# Patient Record
Sex: FEMALE | Race: WHITE | NOT HISPANIC OR LATINO | URBAN - METROPOLITAN AREA
[De-identification: names, ages, dates, MRNs, and addresses within clinical notes are randomized per-mention and may not be internally consistent; named-entity substitution may affect disease eponyms.]

---

## 2024-12-03 ENCOUNTER — OFFICE VISIT (OUTPATIENT)
Dept: URGENT CARE | Age: 49
End: 2024-12-03
Payer: COMMERCIAL

## 2024-12-03 VITALS
BODY MASS INDEX: 23.91 KG/M2 | SYSTOLIC BLOOD PRESSURE: 130 MMHG | OXYGEN SATURATION: 98 % | TEMPERATURE: 97.9 F | HEART RATE: 82 BPM | DIASTOLIC BLOOD PRESSURE: 81 MMHG | WEIGHT: 135 LBS

## 2024-12-03 DIAGNOSIS — J40 BRONCHITIS: Primary | ICD-10-CM

## 2024-12-03 RX ORDER — CYCLOBENZAPRINE HCL 10 MG
TABLET ORAL
COMMUNITY
Start: 2024-07-07

## 2024-12-03 RX ORDER — BROMPHENIRAMINE MALEATE, PSEUDOEPHEDRINE HYDROCHLORIDE, AND DEXTROMETHORPHAN HYDROBROMIDE 2; 30; 10 MG/5ML; MG/5ML; MG/5ML
10 SYRUP ORAL 4 TIMES DAILY PRN
Qty: 120 ML | Refills: 0 | Status: SHIPPED | OUTPATIENT
Start: 2024-12-03 | End: 2024-12-13

## 2024-12-03 RX ORDER — BENZONATATE 100 MG/1
CAPSULE ORAL
COMMUNITY
Start: 2024-11-16

## 2024-12-03 RX ORDER — BUSPIRONE HYDROCHLORIDE 10 MG/1
1 TABLET ORAL
COMMUNITY
Start: 2024-01-18

## 2024-12-03 RX ORDER — DIGITAL THERAPEUTIC,REN DEVICE
MISCELLANEOUS MISCELLANEOUS
COMMUNITY
Start: 2023-12-26

## 2024-12-03 RX ORDER — BUSPIRONE HYDROCHLORIDE 7.5 MG/1
1 TABLET ORAL
COMMUNITY
Start: 2024-11-13

## 2024-12-03 RX ORDER — AZITHROMYCIN 250 MG/1
TABLET, FILM COATED ORAL
Qty: 6 TABLET | Refills: 0 | Status: SHIPPED | OUTPATIENT
Start: 2024-12-03

## 2024-12-03 RX ORDER — DOCUSATE SODIUM 100 MG/1
1 CAPSULE, LIQUID FILLED ORAL
COMMUNITY
Start: 2024-09-23

## 2024-12-03 RX ORDER — BUSPIRONE HYDROCHLORIDE 15 MG/1
1 TABLET ORAL
COMMUNITY
Start: 2024-09-18

## 2024-12-03 RX ORDER — CEFADROXIL 500 MG/1
1 CAPSULE ORAL
COMMUNITY
Start: 2024-09-23

## 2024-12-03 ASSESSMENT — ENCOUNTER SYMPTOMS: COUGH: 1

## 2024-12-03 NOTE — PROGRESS NOTES
Subjective   Patient ID: Ethel Joy is a 49 y.o. female. They present today with a chief complaint of Cough (1 month - cough congestion/).    History of Present Illness  This is a 49-year-old female who presents to the urgent care with complaints of a cough.  Patient states has had a cough for 1 month now.  Patient states she is originally placed on 5 days of doxycycline and did not notice any improvement as it was only a 5-day course.  Patient states she has not had any recent fevers.  Patient states it is a productive cough.  Patient denies any known exposures.  Patient denies any other systemic complaints at this time.      Cough        Past Medical History  Allergies as of 12/03/2024 - Reviewed 12/03/2024   Allergen Reaction Noted    Erythromycin Nausea/vomiting 08/27/2009    Other Other 04/09/2013    Penicillins Rash 04/09/2013       (Not in a hospital admission)       Past Medical History:   Diagnosis Date    Personal history of cervical dysplasia     History of cervical dysplasia    Personal history of other diseases of the digestive system     History of gastroesophageal reflux (GERD)    Personal history of other diseases of the nervous system and sense organs     History of migraine       Past Surgical History:   Procedure Laterality Date    OTHER SURGICAL HISTORY  02/27/2014    Cervix Cryosurgery        reports that she has never smoked. She has never used smokeless tobacco. She reports current alcohol use of about 4.0 standard drinks of alcohol per week. She reports that she does not use drugs.    Review of Systems  Review of Systems   Respiratory:  Positive for cough.    All other systems reviewed and are negative.                                 Objective    Vitals:    12/03/24 1304   BP: 130/81   Pulse: 82   Temp: 36.6 °C (97.9 °F)   SpO2: 98%   Weight: 61.2 kg (135 lb)     No LMP recorded (lmp unknown).    Physical Exam  Vitals and nursing note reviewed.   Constitutional:       Appearance: Normal  appearance.   HENT:      Head: Normocephalic and atraumatic.      Right Ear: Ear canal and external ear normal.      Left Ear: Ear canal and external ear normal.      Ears:      Comments: Middle ear fluid bilaterally     Nose: Nose normal.      Mouth/Throat:      Mouth: Mucous membranes are moist.      Pharynx: Oropharynx is clear.   Eyes:      Extraocular Movements: Extraocular movements intact.      Conjunctiva/sclera: Conjunctivae normal.   Cardiovascular:      Rate and Rhythm: Normal rate and regular rhythm.   Pulmonary:      Effort: Pulmonary effort is normal.      Breath sounds: Normal breath sounds.   Skin:     General: Skin is warm and dry.   Neurological:      General: No focal deficit present.      Mental Status: She is alert and oriented to person, place, and time.   Psychiatric:         Mood and Affect: Mood normal.         Behavior: Behavior normal.         Procedures    Point of Care Test & Imaging Results from this visit  No results found for this visit on 12/03/24.   No results found.    Diagnostic study results (if any) were reviewed by Suzanna Washington PA-C.    Assessment/Plan   Allergies, medications, history, and pertinent labs/EKGs/Imaging reviewed by Suzanna Washington PA-C.     Medical Decision Making  Bronchitis-lungs are clear to auscultation bilaterally.  Greater than 4 weeks of symptoms, patient is stable nontoxic-appearing no acute distress.  Antibiotic was sent with Bromfed to help with cough.    Orders and Diagnoses  Diagnoses and all orders for this visit:  Bronchitis  -     azithromycin (Zithromax Z-Karsten) 250 mg tablet; Take 2 tablets on day one, then 1 tablet on days 2-5, orally.  -     brompheniramine-pseudoeph-DM 2-30-10 mg/5 mL syrup; Take 10 mL by mouth 4 times a day as needed for allergies for up to 10 days.      Medical Admin Record      Patient disposition: Home    Electronically signed by Suzanna Washington PA-C  1:13 PM